# Patient Record
Sex: MALE | Race: OTHER | Employment: OTHER | URBAN - METROPOLITAN AREA
[De-identification: names, ages, dates, MRNs, and addresses within clinical notes are randomized per-mention and may not be internally consistent; named-entity substitution may affect disease eponyms.]

---

## 2023-08-21 ENCOUNTER — HOSPITAL ENCOUNTER (OUTPATIENT)
Dept: GENERAL RADIOLOGY | Facility: HOSPITAL | Age: 63
Discharge: HOME | End: 2023-08-21
Attending: INTERNAL MEDICINE
Payer: MEDICARE

## 2023-08-21 DIAGNOSIS — M54.2 NECK PAIN: ICD-10-CM

## 2023-08-21 PROCEDURE — 72052 X-RAY EXAM NECK SPINE 6/>VWS: CPT | Performed by: INTERNAL MEDICINE

## 2023-08-23 ENCOUNTER — OFFICE VISIT (OUTPATIENT)
Dept: PHYSICAL MEDICINE AND REHAB | Facility: CLINIC | Age: 63
End: 2023-08-23
Payer: MEDICARE

## 2023-08-23 VITALS — WEIGHT: 218 LBS | HEIGHT: 69 IN | BODY MASS INDEX: 32.29 KG/M2

## 2023-08-23 DIAGNOSIS — M47.812 CERVICAL SPONDYLOSIS: Primary | ICD-10-CM

## 2023-08-23 PROCEDURE — 99204 OFFICE O/P NEW MOD 45 MIN: CPT | Performed by: PHYSICAL MEDICINE & REHABILITATION

## 2023-08-23 PROCEDURE — 3008F BODY MASS INDEX DOCD: CPT | Performed by: PHYSICAL MEDICINE & REHABILITATION

## 2023-08-23 RX ORDER — CYCLOBENZAPRINE HCL 10 MG
10 TABLET ORAL NIGHTLY
Qty: 30 TABLET | Refills: 0 | Status: SHIPPED | OUTPATIENT
Start: 2023-08-23 | End: 2023-09-22

## 2023-08-23 RX ORDER — PREDNISONE 10 MG/1
TABLET ORAL
Qty: 28 TABLET | Refills: 0 | Status: SHIPPED | OUTPATIENT
Start: 2023-08-23

## 2023-09-19 ENCOUNTER — TELEPHONE (OUTPATIENT)
Dept: PHYSICAL THERAPY | Facility: HOSPITAL | Age: 63
End: 2023-09-19

## 2023-09-20 ENCOUNTER — OFFICE VISIT (OUTPATIENT)
Dept: PHYSICAL MEDICINE AND REHAB | Facility: CLINIC | Age: 63
End: 2023-09-20
Payer: MEDICARE

## 2023-09-20 VITALS — WEIGHT: 218 LBS | BODY MASS INDEX: 32.29 KG/M2 | HEIGHT: 69 IN

## 2023-09-20 DIAGNOSIS — M48.10 DISH (DIFFUSE IDIOPATHIC SKELETAL HYPEROSTOSIS): ICD-10-CM

## 2023-09-20 DIAGNOSIS — Z88.8 ASPIRIN ALLERGY: ICD-10-CM

## 2023-09-20 DIAGNOSIS — M47.812 CERVICAL SPONDYLOSIS: Primary | ICD-10-CM

## 2023-09-20 PROCEDURE — 99214 OFFICE O/P EST MOD 30 MIN: CPT | Performed by: PHYSICAL MEDICINE & REHABILITATION

## 2023-09-20 PROCEDURE — 3008F BODY MASS INDEX DOCD: CPT | Performed by: PHYSICAL MEDICINE & REHABILITATION

## 2023-09-20 RX ORDER — CYCLOBENZAPRINE HCL 5 MG
5 TABLET ORAL NIGHTLY
Qty: 30 TABLET | Refills: 0 | Status: SHIPPED | OUTPATIENT
Start: 2023-09-20 | End: 2023-10-20

## 2023-09-26 ENCOUNTER — OFFICE VISIT (OUTPATIENT)
Dept: PHYSICAL THERAPY | Age: 63
End: 2023-09-26
Attending: PHYSICAL MEDICINE & REHABILITATION
Payer: MEDICARE

## 2023-09-26 DIAGNOSIS — M47.812 CERVICAL SPONDYLOSIS: Primary | ICD-10-CM

## 2023-09-26 PROCEDURE — 97140 MANUAL THERAPY 1/> REGIONS: CPT

## 2023-09-26 PROCEDURE — 97161 PT EVAL LOW COMPLEX 20 MIN: CPT

## 2023-09-27 ENCOUNTER — APPOINTMENT (OUTPATIENT)
Dept: PHYSICAL THERAPY | Age: 63
End: 2023-09-27
Attending: PHYSICAL MEDICINE & REHABILITATION
Payer: MEDICARE

## 2023-09-28 ENCOUNTER — OFFICE VISIT (OUTPATIENT)
Dept: PHYSICAL THERAPY | Age: 63
End: 2023-09-28
Attending: PHYSICAL MEDICINE & REHABILITATION
Payer: MEDICARE

## 2023-09-28 PROCEDURE — 97110 THERAPEUTIC EXERCISES: CPT

## 2023-09-28 PROCEDURE — 97140 MANUAL THERAPY 1/> REGIONS: CPT

## 2023-09-28 NOTE — PROGRESS NOTES
Dx: Cervical spondylosis (M47.812)        Insurance (Authorized # of Visits):  8 [employer]   POC visits: 8   Authorizing Physician: Ankur Cortez MD visit:   Fall Risk: standard         Precautions:            Subjective: Pt states that he was a little more sore after last session. He states he has been doing his ROM exercises. Pain level: 5/10 neck pain   Objective: see flow sheet below for treatment   FRS and ERS R/L at C2 dysfunction       Assessment: Noted pt with improved cervical rot ROM after manual therapy, but no decrease in pain level. Goals:   Pt will improve cervical AROM flexion > 25 degrees to improve tolerance for looking down to tie shoes. 2.   Pt will improve cervical AROM extension > 20 degrees to improve tolerance for putting dishes into overhead cabinets. 3.   Pt will improve cervical AROM rotation >20 degrees to improve tolerance for turning head to check blind spot while driving. Plan: assess response to treatment   Date: 9/28/2023  TX#: 2/8 Date:                 TX#: 3/ Date:                 TX#: 4/ Date:                 TX#: 5/ Date:    Tx#: 6/   Manual therapy:  FRS R/L AA  STM levator/ UT       There ex:  cervical rot w/towel 10x R/L  Seated rot w/eyes first 10x R/L                     HEP: 9/28/23- cspine rot w/towel    Charges: MTx1 (15 min), TEx1(10 min)      Total Timed Treatment: 30 min  Total Treatment Time: 30 min

## 2023-09-29 ENCOUNTER — APPOINTMENT (OUTPATIENT)
Dept: PHYSICAL THERAPY | Age: 63
End: 2023-09-29
Attending: PHYSICAL MEDICINE & REHABILITATION
Payer: MEDICARE

## 2023-10-02 ENCOUNTER — APPOINTMENT (OUTPATIENT)
Dept: PHYSICAL THERAPY | Age: 63
End: 2023-10-02
Attending: PHYSICAL MEDICINE & REHABILITATION
Payer: MEDICARE

## 2023-10-03 ENCOUNTER — OFFICE VISIT (OUTPATIENT)
Dept: PHYSICAL THERAPY | Age: 63
End: 2023-10-03
Attending: PHYSICAL MEDICINE & REHABILITATION
Payer: MEDICARE

## 2023-10-03 PROCEDURE — 97110 THERAPEUTIC EXERCISES: CPT

## 2023-10-03 PROCEDURE — 97140 MANUAL THERAPY 1/> REGIONS: CPT

## 2023-10-03 NOTE — PROGRESS NOTES
Dx: Cervical spondylosis (M47.812)        Insurance (Authorized # of Visits):  8 [employer]   POC visits: 8   Authorizing Physician: Jane Cortez MD visit:   Fall Risk: standard         Precautions:            Subjective: Pt states that he is able to move his neck a little more but still has pain. Pain level: 5/10 neck pain   Objective: see flow sheet below for treatment   FRS and ERS R/L at C2 dysfunction   Cervical AROM:  Pain (+/-)   Flexion 20 +pain center   Extension 20 +pain center   R Sidebend 15 +pain center   L Sidebend 15 +pain center   R Rotation 25 +pain center   L Rotation 25 +pain center       Assessment: Overall improved cervical ROM noted, all motions continue to be painful. Goals:   Pt will improve cervical AROM flexion > 25 degrees to improve tolerance for looking down to tie shoes. 2.   Pt will improve cervical AROM extension > 20 degrees to improve tolerance for putting dishes into overhead cabinets. 3.   Pt will improve cervical AROM rotation >20 degrees to improve tolerance for turning head to check blind spot while driving. Plan: assess response to treatment   Date: 9/28/2023  TX#: 2/8 Date: 10/3/23            TX#: 3/8 Date:                 TX#: 4/ Date:                 TX#: 5/ Date:    Tx#: 6/   Manual therapy:  FRS R/L AA  STM levator/ UT Manual therapy:  FRS R AA  STM levator/ UT R/L      There ex:  cervical rot w/towel 10x R/L  Seated rot w/eyes first 10x R/L There ex:  Supine cervical nod 10x  Seated cervical rot w/towel 10x R/L  Cervical rot w/OP 10x R/L                    HEP: 9/28/23- cspine rot w/towel  10/3/23- cervical rot w/OP    Charges: MTx1 (15 min), TEx2(23 min)      Total Timed Treatment: 38 min  Total Treatment Time: 38 min

## 2023-10-04 ENCOUNTER — APPOINTMENT (OUTPATIENT)
Dept: PHYSICAL THERAPY | Age: 63
End: 2023-10-04
Attending: PHYSICAL MEDICINE & REHABILITATION
Payer: MEDICARE

## 2023-10-05 ENCOUNTER — OFFICE VISIT (OUTPATIENT)
Dept: PHYSICAL THERAPY | Age: 63
End: 2023-10-05
Attending: PHYSICAL MEDICINE & REHABILITATION
Payer: MEDICARE

## 2023-10-05 PROCEDURE — 97110 THERAPEUTIC EXERCISES: CPT

## 2023-10-05 PROCEDURE — 97140 MANUAL THERAPY 1/> REGIONS: CPT

## 2023-10-05 NOTE — PROGRESS NOTES
Dx: Cervical spondylosis (M47.812)        Insurance (Authorized # of Visits):  8 [employer]   POC visits: 8   Authorizing Physician: Daniela Cortez MD visit:   Fall Risk: standard         Precautions:            Subjective: Pt states that he feels that neck pain is decreasing. He has been doing exercises. Pain level: 5/10 neck pain with rotation  Objective: see flow sheet below for treatment   FRS and ERS R/L at C2 dysfunction   Cervical AROM:  Pain (+/-)   Flexion 20 +pain center   Extension 20 +pain center   R Sidebend 15 +pain center   L Sidebend 15 +pain center   R Rotation 25 +pain center   L Rotation 25 +pain center       Assessment: Initiated thoracic ROM. Discussed use of only 1 pillow for sleeping and sitting up vs laying with 3 pillows under head to watch tv. Goals:   Pt will improve cervical AROM flexion > 25 degrees to improve tolerance for looking down to tie shoes. 2.   Pt will improve cervical AROM extension > 20 degrees to improve tolerance for putting dishes into overhead cabinets. 3.   Pt will improve cervical AROM rotation >20 degrees to improve tolerance for turning head to check blind spot while driving. Plan: assess response to treatment   Date: 9/28/2023  TX#: 2/8 Date: 10/3/23            TX#: 3/8 Date: 10/5/23           TX#: 4/8 Date:                 TX#: 5/ Date:    Tx#: 6/   Manual therapy:  FRS R/L AA  STM levator/ UT Manual therapy:  FRS R AA  STM levator/ UT R/L Manual therapy:  FRS R AA  STM levator/UT R/L     There ex:  cervical rot w/towel 10x R/L  Seated rot w/eyes first 10x R/L There ex:  Supine cervical nod 10x  Seated cervical rot w/towel 10x R/L  Cervical rot w/OP 10x R/L There ex:  Supine cervical nod 10x   Supine cervical rot w/nod 10x R/L  Seated thoracic ext w/ball 10x  Seated thoracic rot R/L                   HEP: 9/28/23- cspine rot w/towel  10/3/23- cervical rot w/OP    Charges: MTx 1(15 min), TEx2(23 min)      Total Timed Treatment: 38 min  Total Treatment Time: 38 min

## 2023-10-09 ENCOUNTER — APPOINTMENT (OUTPATIENT)
Dept: PHYSICAL THERAPY | Age: 63
End: 2023-10-09
Attending: PHYSICAL MEDICINE & REHABILITATION
Payer: MEDICARE

## 2023-10-10 ENCOUNTER — OFFICE VISIT (OUTPATIENT)
Dept: PHYSICAL THERAPY | Age: 63
End: 2023-10-10
Attending: PHYSICAL MEDICINE & REHABILITATION
Payer: MEDICARE

## 2023-10-10 PROCEDURE — 97110 THERAPEUTIC EXERCISES: CPT

## 2023-10-10 PROCEDURE — 97140 MANUAL THERAPY 1/> REGIONS: CPT

## 2023-10-10 NOTE — PROGRESS NOTES
Dx: Cervical spondylosis (M47.812)        Insurance (Authorized # of Visits):  8 [employer]   POC visits: 8   Authorizing Physician: Barbara Cortez MD visit:   Fall Risk: standard         Precautions:            Subjective: Pt states that neck pain is improving. 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 Pain level: 4/10 neck pain with rotation  Objective: see flow sheet below for treatment   10/5/23:  Cervical AROM:  Pain (+/-)   Flexion 20 +pain center   Extension 20 +pain center   R Sidebend 15 +pain center   L Sidebend 15 +pain center   R Rotation 25 +pain center   L Rotation 25 +pain center       Assessment: Initiated thoracic mobs to upper thoracic spine to improve mobility. Goals:   Pt will improve cervical AROM flexion > 25 degrees to improve tolerance for looking down to tie shoes. 2.   Pt will improve cervical AROM extension > 20 degrees to improve tolerance for putting dishes into overhead cabinets. 3.   Pt will improve cervical AROM rotation >20 degrees to improve tolerance for turning head to check blind spot while driving. Plan: assess response to treatment   Date: 9/28/2023  TX#: 2/8 Date: 10/3/23            TX#: 3/8 Date: 10/5/23           TX#: 4/8 Date: 10/10/23             TX#: 5/8 Date:    Tx#: 6/   Manual therapy:  FRS R/L AA  STM levator/ UT Manual therapy:  FRS R AA  STM levator/ UT R/L Manual therapy:  FRS R AA  STM levator/UT R/L Manual therapy:  Prone thoracic PA mobs  Supine STM levator/UT MFR  Supine cspine lat glides    There ex:  cervical rot w/towel 10x R/L  Seated rot w/eyes first 10x R/L There ex:  Supine cervical nod 10x  Seated cervical rot w/towel 10x R/L  Cervical rot w/OP 10x R/L There ex:  Supine cervical nod 10x   Supine cervical rot w/nod 10x R/L  Seated thoracic ext w/ball 10x  Seated thoracic rot R/L There ex:  Supine cervical nods 10x  Supine cspine rot w/nod 10x R/L  Seated thoracic ext w/ball 10x  Seated thoracic rot then cspine rot 10x R/L  Seated cspine rot w/towel 10x R/L  Rows blue TB 15x                  HEP: 9/28/23- cspine rot w/towel  10/3/23- cervical rot w/OP    Charges: MTx 1(15 min), TEx2(23 min)      Total Timed Treatment: 38 min  Total Treatment Time: 38 min

## 2023-10-11 ENCOUNTER — APPOINTMENT (OUTPATIENT)
Dept: PHYSICAL THERAPY | Age: 63
End: 2023-10-11
Attending: PHYSICAL MEDICINE & REHABILITATION
Payer: MEDICARE

## 2023-10-12 ENCOUNTER — OFFICE VISIT (OUTPATIENT)
Dept: PHYSICAL THERAPY | Age: 63
End: 2023-10-12
Attending: PHYSICAL MEDICINE & REHABILITATION
Payer: MEDICARE

## 2023-10-12 PROCEDURE — 97140 MANUAL THERAPY 1/> REGIONS: CPT

## 2023-10-12 PROCEDURE — 97110 THERAPEUTIC EXERCISES: CPT

## 2023-10-12 NOTE — PROGRESS NOTES
Discharge Summary  Pt has attended 6 visits in Physical Therapy. Dx: Cervical spondylosis (M47.812)        Insurance (Authorized # of Visits):  8 [employer]   POC visits: 8   Authorizing Physician: Rogelio Cortez MD visit:   Fall Risk: standard         Precautions:            Subjective: Pt states that neck is improving less pain and more motion. He is leaving to go out of the country next [place], so would like to be discharged today. Pain level: 4/10 neck pain with rotation  Objective: see flow sheet below for treatment     Cervical AROM:  Pain (+/-)   Flexion 30 -   Extension 30 +pain center   R Sidebend 20 +pain center   L Sidebend 20 -   R Rotation 30 +pain center   L Rotation 30 -     Strength: (* denotes performed with pain)  UE/Scapular   Shoulder Flex: R 5/5, L 5/5  Shoulder ABD (C5): R 5/5, L 5/5  Biceps (C6): R 5/5, L 5/5  Triceps (C7): R 5/5, L 5/5    Rhomboids: R NT/5, L NT/5  Mid trap: R NT/5; L NT/5  Lats: R NT/5, L NT/5  Low trap: R NT/5; L NT/5  Serratus: R NT/5, L NT/5       Assessment: Pt has attended 6 PT sessions for neck pain. Pt presents with improved cspine ROM with less pain, no pain with shoulder strength. Pt has met 3/3 LTGs. Pt has been educated and provided with HEP. Goals:   Pt will improve cervical AROM flexion > 25 degrees to improve tolerance for looking down to tie shoes. -MET  2. Pt will improve cervical AROM extension > 20 degrees to improve tolerance for putting dishes into overhead cabinets. -MET  3. Pt will improve cervical AROM rotation >20 degrees to improve tolerance for turning head to check blind spot while driving. -MET    Plan: discharge pt to HEP  Date: 9/28/2023  TX#: 2/8 Date: 10/3/23            TX#: 3/8 Date: 10/5/23           TX#: 4/8 Date: 10/10/23             TX#: 5/8 Date: 10/12/23  Tx#: 6/8   Manual therapy:  FRS R/L AA  STM levator/ UT Manual therapy:  FRS R AA  STM levator/ UT R/L Manual therapy:  FRS R AA  STM levator/UT R/L Manual therapy:  Prone thoracic PA mobs  Supine STM levator/UT MFR  Supine cspine lat glides Manual therapy:  Supine STM/levator/UT MFR  Supine cspine lat glides   There ex:  cervical rot w/towel 10x R/L  Seated rot w/eyes first 10x R/L There ex:  Supine cervical nod 10x  Seated cervical rot w/towel 10x R/L  Cervical rot w/OP 10x R/L There ex:  Supine cervical nod 10x   Supine cervical rot w/nod 10x R/L  Seated thoracic ext w/ball 10x  Seated thoracic rot R/L There ex:  Supine cervical nods 10x  Supine cspine rot w/nod 10x R/L  Seated thoracic ext w/ball 10x  Seated thoracic rot then cspine rot 10x R/L  Seated cspine rot w/towel 10x R/L  Rows blue TB 15x There ex:  Supine cervical rot w/nod 10x R/L  Seated thoracic ext w/ball 10x  Seated thoracic rot with cervical rot 10x R/L  Rows blue TB 15x                 HEP: 9/28/23- cspine rot w/towel  10/3/23- cervical rot w/OP    Charges: MTx 1(15 min), TEx2(23 min)      Total Timed Treatment: 38 min  Total Treatment Time: 38 min

## 2023-10-16 ENCOUNTER — APPOINTMENT (OUTPATIENT)
Dept: PHYSICAL THERAPY | Age: 63
End: 2023-10-16
Attending: PHYSICAL MEDICINE & REHABILITATION
Payer: MEDICARE

## 2025-02-14 ENCOUNTER — OFFICE VISIT (OUTPATIENT)
Dept: SURGERY | Facility: CLINIC | Age: 65
End: 2025-02-14

## 2025-02-14 VITALS
BODY MASS INDEX: 32.29 KG/M2 | HEIGHT: 69 IN | HEART RATE: 83 BPM | SYSTOLIC BLOOD PRESSURE: 189 MMHG | WEIGHT: 218 LBS | DIASTOLIC BLOOD PRESSURE: 95 MMHG

## 2025-02-14 DIAGNOSIS — N40.1 BPH WITH OBSTRUCTION/LOWER URINARY TRACT SYMPTOMS: Primary | ICD-10-CM

## 2025-02-14 DIAGNOSIS — Z85.46 HISTORY OF PROSTATE CANCER: ICD-10-CM

## 2025-02-14 DIAGNOSIS — N13.8 BPH WITH OBSTRUCTION/LOWER URINARY TRACT SYMPTOMS: Primary | ICD-10-CM

## 2025-02-14 PROCEDURE — 3080F DIAST BP >= 90 MM HG: CPT | Performed by: UROLOGY

## 2025-02-14 PROCEDURE — 3077F SYST BP >= 140 MM HG: CPT | Performed by: UROLOGY

## 2025-02-14 PROCEDURE — 99204 OFFICE O/P NEW MOD 45 MIN: CPT | Performed by: UROLOGY

## 2025-02-14 PROCEDURE — 3008F BODY MASS INDEX DOCD: CPT | Performed by: UROLOGY

## 2025-02-14 RX ORDER — TAMSULOSIN HYDROCHLORIDE 0.4 MG/1
0.4 CAPSULE ORAL
Qty: 90 CAPSULE | Refills: 1 | Status: SHIPPED | OUTPATIENT
Start: 2025-02-14

## 2025-02-14 NOTE — PROGRESS NOTES
[facility] Urology  Initial Office Consultation    HPI:   Joe Garcia is a 65 year old male here today for consultation at the request of, and a copy of this note will be sent to, No primary care provider on file..  He is accompanied by his wife.    1.  History of prostate cancer  2.  BPH with LUTS  Patient reports history of prostate cancer being diagnosed in [place] in October 2015.  He is unclear about the parameters upon diagnosis.  He reports undergoing cryoablation of the prostate (unclear if focal or whole gland).  He denies undergoing hormonal therapy.    Seems to have had a good biochemical response.  His PSA from August 2024 was 1.6 ng/mL.    Patient with worsening LUTS over the past few years.  He reports nocturia x 2, weak stream, occasional straining to urinate, sensation of incomplete bladder emptying, frequency and urgency.  His IPSS is 25 with a QOL score of 6.    Bladder scan reveals a PVR of 19 mL.    No gross hematuria or dysuria.      PAST MEDICAL HISTORY: Prostate cancer.    PAST SURGICAL HISTORY: Prostate cryoablation.    History reviewed. No pertinent family history.    Social History     Socioeconomic History    Marital status:    Other Topics Concern    Caffeine Concern Yes     Comment: 1 cup of coffee daily    Exercise No   Social History Narrative    The patient does not use an assistive device..      The patient does live in a home with stairs.     Allergies: Aspirin      REVIEW OF SYSTEMS:  Pertinent positives and negatives per HPI. A 12-point ROS was performed and is otherwise negative.       EXAM:  BP (!) 189/95 (BP Location: Left arm, Patient Position: Sitting, Cuff Size: adult)   Pulse 83   Ht 5' 9\" (1.753 m)   Wt 218 lb (98.9 kg)   BMI 32.19 kg/m²     Physical Exam  Constitutional:       Appearance: He is well-developed.   HENT:      Head: Normocephalic.   Eyes:      General: No scleral icterus.  Cardiovascular:      Rate and  Rhythm: Normal rate.   Pulmonary:      Effort: Pulmonary effort is normal.   Abdominal:      General: There is no distension.      Palpations: Abdomen is soft.      Tenderness: There is no abdominal tenderness.   Genitourinary:     Penis: Uncircumcised.       Comments: VASQUEZ revealing 1+ enlarged prostate, nontender, nonnodular.  Skin:     General: Skin is warm and dry.   Neurological:      Mental Status: He is alert and oriented to person, place, and time.   Psychiatric:         Mood and Affect: Mood normal.         Behavior: Behavior normal.       LABS:  See HPI.      IMAGING:  No results found.      IMPRESSION:  65 year old male with history of prostate cancer diagnosed in 2015 and treated with cryoablation in [place].  Unclear diagnosis or treatment details otherwise.    Good biochemical response with PSA levels being acceptable.    Worsening LUTS.  Mild BPH on exam.    Reviewed with patient and wife.  Management options discussed.    Regarding his prostate cancer: Recommend continued surveillance with annual PSA levels.    Discussed empiric trial of tamsulosin 0.4 mg daily for 3 months to see if his symptoms improved.  If not, then further evaluation would be recommended to rule out urethral strictures.    Benefits, risk, and alternative treatments were discussed.  He is agreeable.      PLAN:  1.  Continue prostate cancer surveillance with annual PSA levels.    2.  Start tamsulosin 0.4 mg daily.    Follow-up in 3 months for an updated IPSS and bladder scan/PVR.    Atif Guzman MD  2/14/2025

## 2025-02-17 ENCOUNTER — TELEPHONE (OUTPATIENT)
Dept: SURGERY | Facility: CLINIC | Age: 65
End: 2025-02-17

## 2025-02-17 NOTE — TELEPHONE ENCOUNTER
Per spouse states pharmacy never received tamsulosin prescription on 2/14, asking if prescription can be sent again. Please advise thank you.
